# Patient Record
Sex: FEMALE | Race: WHITE
[De-identification: names, ages, dates, MRNs, and addresses within clinical notes are randomized per-mention and may not be internally consistent; named-entity substitution may affect disease eponyms.]

---

## 2020-10-23 ENCOUNTER — HOSPITAL ENCOUNTER (OUTPATIENT)
Dept: HOSPITAL 92 - BICULT | Age: 40
End: 2020-10-23
Attending: OBSTETRICS & GYNECOLOGY
Payer: COMMERCIAL

## 2020-10-23 DIAGNOSIS — Z17.0: ICD-10-CM

## 2020-10-23 DIAGNOSIS — C50.411: Primary | ICD-10-CM

## 2020-10-23 PROCEDURE — 88341 IMHCHEM/IMCYTCHM EA ADD ANTB: CPT

## 2020-10-23 PROCEDURE — 19083 BX BREAST 1ST LESION US IMAG: CPT

## 2020-10-23 PROCEDURE — 88305 TISSUE EXAM BY PATHOLOGIST: CPT

## 2020-10-23 PROCEDURE — 88342 IMHCHEM/IMCYTCHM 1ST ANTB: CPT

## 2020-10-23 PROCEDURE — 0H9U3ZX DRAINAGE OF LEFT BREAST, PERCUTANEOUS APPROACH, DIAGNOSTIC: ICD-10-PCS | Performed by: RADIOLOGY

## 2020-10-23 NOTE — MMO
FILMS COMPARED:

The present examination has been compared to prior imaging studies performed at

[facility] on 08/20/2020 and 10/14/2020.

 

MAMMOGRAM FINDINGS:

The breast is heterogeneously dense, which could obscure a lesion on mammography.

 

Biopsy marker is seen posterior outer breast.

 

IMPRESSION:

FINDING IN THE RIGHT BREAST IS HIGHLY SUGGESTIVE OF MALIGNANCY.

POST BIOPSY. PATHOLOGY PENDING.

 

ACR BI-RADS Category 5 - Highly suggestive of malignancy - appropriate action

should be taken

 

 

Reported by: CORTEZ NINA MD

Electonically Signed: [id number] staff

## 2020-10-23 NOTE — ULT
PROCEDURE:

1. Ultrasound guided core biopsy mass left breast.

2. Ultrasound guided biopsy marker placement at biopsy site. 

3. Post biopsy mammogram. 

 

INDICATIONS:

Patient referred from her primary care doctor in [place], [place]. Outside images from [facility]
on reviewed including mammogram and ultrasound exam. Mammograms showed suspicious microcalcifications
 in the upper outer right breast. Ultrasound demonstrate a hypoechoic mass which is labeled at 10 o'c
lock on the outside study. 

 

FINDINGS: 

1. Preoperative ultrasound of the right breast does demonstrate a hypoechoic spiculated mass at 9 to 
10 o'clock. This corresponds to the mass seen on the outside ultrasound exam. 

 

This mass is biopsy using ultrasound guided core biopsy with a 14 gauge bard instrument. Five core sp
ecimens were obtained through the mass. Prefire and post fire images with each specimen demonstrates 
biopsy through the mid portion of the mass. Specimens were placed in formalin. 

 

2. Biopsy marker was placed at biopsy site with ultrasound guidance. 

 

3. Postprocedure mammogram confirms biopsy marker in the posterior upper outer right breast correspon
ding to the location of the ultrasound mass. 

 

Biopsy report will be relayed to patient. Patient will follow-up with her primary care doctor in [facility] and seek further treatment in [place] as warranted from the biopsy report. 

 

PROCEDURE NOTE:

The right breast was prepped and draped in a sterile manner. Local anesthesia was administered in the
 outer right breast at site of selected puncture. Local anesthesia was administered under ultrasound 
guidance. There is a tiny skin nick made with scalpel. A 14 gauge Bard instrument with guide needle i
n place was introduced under ultrasound guidance. The instrument was introduced from the inferior out
er right breast approaching the 10 o'clock lesion. The biopsy needle was advanced to the leading edge
 of the mass. A prefire image was obtained. A post fire image confirmed biopsy to the mid portion of 
the mass. 

 

This was repeated x5 with five 14 gauge core specimens obtained and placed in formalin. Biopsy marker
 was then placed at biopsy site with ultrasound guidance. 

 

The patient tolerated the procedure well and was sent for postprocedure mammogram. 

 

Postprocedure mammogram confirmed biopsy marker in the posterior aspect of the upper outer right [place]
st. 

 

POS: OFF

## 2020-11-05 ENCOUNTER — HOSPITAL ENCOUNTER (OUTPATIENT)
Dept: HOSPITAL 92 - BICMRI | Age: 40
Discharge: HOME | End: 2020-11-05
Attending: SPECIALIST
Payer: COMMERCIAL

## 2020-11-05 DIAGNOSIS — N63.11: ICD-10-CM

## 2020-11-05 DIAGNOSIS — C50.911: Primary | ICD-10-CM

## 2020-11-05 PROCEDURE — A9577 INJ MULTIHANCE: HCPCS

## 2020-11-05 PROCEDURE — C8908 MRI W/O FOL W/CONT, BREAST,: HCPCS

## 2020-11-06 ENCOUNTER — HOSPITAL ENCOUNTER (OUTPATIENT)
Dept: HOSPITAL 92 - MAMMO | Age: 40
End: 2020-11-06
Attending: SPECIALIST
Payer: COMMERCIAL

## 2020-11-06 DIAGNOSIS — C50.911: Primary | ICD-10-CM

## 2020-11-06 PROCEDURE — 88341 IMHCHEM/IMCYTCHM EA ADD ANTB: CPT

## 2020-11-06 PROCEDURE — 76098 X-RAY EXAM SURGICAL SPECIMEN: CPT

## 2020-11-06 PROCEDURE — 88342 IMHCHEM/IMCYTCHM 1ST ANTB: CPT

## 2020-11-06 PROCEDURE — 0H9W0ZX DRAINAGE OF RIGHT NIPPLE, OPEN APPROACH, DIAGNOSTIC: ICD-10-PCS | Performed by: RADIOLOGY

## 2020-11-06 PROCEDURE — 19081 BX BREAST 1ST LESION STRTCTC: CPT

## 2020-11-06 PROCEDURE — 88305 TISSUE EXAM BY PATHOLOGIST: CPT

## 2020-11-06 NOTE — MMO
Stereotactic guided biopsy right breast microcalcifications.



Surgical specimen mammography right breast

Right diagnostic mammogram post biopsy



HISTORY: Breast cancer. Suspicious microcalcifications. Evaluate for DCIS.



FINDINGS: After explaining the procedure and answering all questions, the large area of microcalcific
ations deep within the lateral aspect of the right breast was visualized.



Sterile technique, buffered local anesthesia, stereotactic guidance, and a lateral approach were used
 to carefully advance a 10-gauge vacuum-assisted needle into the microcalcification cluster.

Position was confirmed with stereotactic imaging.



A total of 12 10-gauge vacuum-assisted specimens were obtained without difficulty.



Mammographic evaluation surgical specimen shows extensive microcalcifications throughout the tissue.



Localization clip was placed in the biopsy bed under stereotactic control. Needle was removed and hem
ostasis obtained using direct pressure. Patient tolerated the procedure well and was eventually

dismissed in good condition.



Postprocedure diagnostic mammogram images show removal of a portion of the microcalcification cluster
 deep within the lateral aspect of the right breast. Pocket of gas and localization clip are now

evident at the biopsy bed.



  



IMPRESSION :

Technically successful stereotactic guided biopsy right breast microcalcifications. Pathology is pend
ing.



Reported By: LEIGH Grayson 

Electronically Signed:  11/6/2020 8:54 AM

## 2020-11-09 NOTE — MRI
MRI BREAST W W/O CONT BILAT



History: Abnormal right breast mass and breast calcifications



Comparison: Ultrasonographic right breast mass biopsy October 23, 2020. Mammogram right breast Octobe
r 14, 2020. Screening mammogram of both breasts August 2020.



Findings: Multiplanar multisequence MRI of the breasts was performed prior to and after the intraveno
us ministration of contrast. Exam was reviewed on an independent 3-D workstation.



The breasts are heterogeneously dense. Mild background parenchymal enhancement.



Corresponding to the ultrasonographic findings is a 1.4 cm mass upper outer quadrant right breast. Th
is has type III kinetics. Adjacent to this are smaller masses;  the first is medial to the primary

mass approximately 7 mm measuring up to 6 mm in size. This also has type III kinetics. Anterior to th
e primary mass, approximately 1.3 cm, is another 6 mm focus of abnormal hyperenhancement. This is

all within the same segment of breast.



Crossing the right upper outer and right lower outer quadrants is non-masslike enhancement. This abno
rmal area of enhancement corresponds to abnormal calcifications seen on recent mammography.



In the left breast, there is a small enhancing focus measuring 3 mm, 1.5 cm from the nipple, adjacent
 to a cyst. This cyst measures up to 1.4 cm in size. There is also lower outer and upper outer

cysts of the left breast.



Complex cyst upper outer left breast posterior depth does not have any significant internal enhanceme
nt.



Impression: 

BI-RADS Category 6: Known malignancy. This is the 1.3 cm mass posterior depth upper outer right breas
t.  High suspicion for multifocal disease with adjacent small satellite masses along with adjacent

multicentric upper outer and lower outer quadrant non-masslike enhancement suggesting DCIS.

2. Small enhancing focus of the left breast measuring 3 mm 1.5 cm from the nipple is too small to yuli
racterize and not well seen on prior ultrasound or mammogram. Follow-up breast MRI in 6 months

recommended.



Transcribed Date/Time: 11/9/2020 11:55 AM



Reported By: Siva Watson 

Electronically Signed:  11/9/2020 2:45 PM

## 2020-11-19 ENCOUNTER — HOSPITAL ENCOUNTER (OUTPATIENT)
Dept: HOSPITAL 92 - LABBT | Age: 40
Discharge: HOME | End: 2020-11-19
Attending: SPECIALIST
Payer: COMMERCIAL

## 2020-11-19 DIAGNOSIS — C50.911: ICD-10-CM

## 2020-11-19 DIAGNOSIS — Z20.828: ICD-10-CM

## 2020-11-19 DIAGNOSIS — Z01.812: Primary | ICD-10-CM

## 2020-11-19 LAB
ANION GAP SERPL CALC-SCNC: 11 MMOL/L (ref 10–20)
BASOPHILS # BLD AUTO: 0 10X3/UL (ref 0–0.2)
BASOPHILS NFR BLD AUTO: 0.4 % (ref 0–2)
BUN SERPL-MCNC: 14 MG/DL (ref 7–18.7)
CALCIUM SERPL-MCNC: 9.2 MG/DL (ref 7.8–10.44)
CHLORIDE SERPL-SCNC: 105 MMOL/L (ref 98–107)
CO2 SERPL-SCNC: 26 MMOL/L (ref 22–29)
CREAT CL PREDICTED SERPL C-G-VRATE: 0 ML/MIN (ref 70–130)
EOSINOPHIL # BLD AUTO: 0.1 10X3/UL (ref 0–0.5)
EOSINOPHIL NFR BLD AUTO: 0.9 % (ref 0–6)
GLUCOSE SERPL-MCNC: 85 MG/DL (ref 70–105)
HGB BLD-MCNC: 13.4 G/DL (ref 12–16)
LYMPHOCYTES NFR BLD AUTO: 33.5 % (ref 18–47)
MCH RBC QN AUTO: 30.5 PG (ref 27–33)
MCV RBC AUTO: 92.3 FL (ref 80–100)
MONOCYTES # BLD AUTO: 0.8 10X3/UL (ref 0–1.1)
MONOCYTES NFR BLD AUTO: 9.5 % (ref 0–10)
NEUTROPHILS # BLD AUTO: 4.5 10X3/UL (ref 1.5–8.4)
NEUTROPHILS NFR BLD AUTO: 55.5 % (ref 40–75)
PLATELET # BLD AUTO: 280 10X3/UL (ref 130–400)
POTASSIUM SERPL-SCNC: 4.3 MMOL/L (ref 3.5–5.1)
PREGS CONTROL BACKGROUND?: (no result)
PREGS CONTROL BAR APPEAR?: YES
RBC # BLD AUTO: 4.39 10X6/UL (ref 3.9–5.2)
SODIUM SERPL-SCNC: 138 MMOL/L (ref 136–145)
WBC # BLD AUTO: 8 10X3/UL (ref 4.5–11)

## 2020-11-19 PROCEDURE — 85025 COMPLETE CBC W/AUTO DIFF WBC: CPT

## 2020-11-19 PROCEDURE — 87635 SARS-COV-2 COVID-19 AMP PRB: CPT

## 2020-11-19 PROCEDURE — 84703 CHORIONIC GONADOTROPIN ASSAY: CPT

## 2020-11-19 PROCEDURE — U0003 INFECTIOUS AGENT DETECTION BY NUCLEIC ACID (DNA OR RNA); SEVERE ACUTE RESPIRATORY SYNDROME CORONAVIRUS 2 (SARS-COV-2) (CORONAVIRUS DISEASE [COVID-19]), AMPLIFIED PROBE TECHNIQUE, MAKING USE OF HIGH THROUGHPUT TECHNOLOGIES AS DESCRIBED BY CMS-2020-01-R: HCPCS

## 2020-11-19 PROCEDURE — 80048 BASIC METABOLIC PNL TOTAL CA: CPT

## 2020-11-24 ENCOUNTER — HOSPITAL ENCOUNTER (OUTPATIENT)
Dept: HOSPITAL 92 - SDC | Age: 40
Discharge: HOME | End: 2020-11-24
Attending: SPECIALIST
Payer: COMMERCIAL

## 2020-11-24 VITALS — BODY MASS INDEX: 23.3 KG/M2

## 2020-11-24 DIAGNOSIS — Z91.048: ICD-10-CM

## 2020-11-24 DIAGNOSIS — C50.411: Primary | ICD-10-CM

## 2020-11-24 DIAGNOSIS — Z87.891: ICD-10-CM

## 2020-11-24 DIAGNOSIS — N60.22: ICD-10-CM

## 2020-11-24 DIAGNOSIS — F41.9: ICD-10-CM

## 2020-11-24 DIAGNOSIS — D64.9: ICD-10-CM

## 2020-11-24 DIAGNOSIS — Z17.1: ICD-10-CM

## 2020-11-24 DIAGNOSIS — N60.82: ICD-10-CM

## 2020-11-24 PROCEDURE — 88334 PATH CONSLTJ SURG CYTO XM EA: CPT

## 2020-11-24 PROCEDURE — A9541 TC99M SULFUR COLLOID: HCPCS

## 2020-11-24 PROCEDURE — 88331 PATH CONSLTJ SURG 1 BLK 1SPC: CPT

## 2020-11-24 PROCEDURE — 71045 X-RAY EXAM CHEST 1 VIEW: CPT

## 2020-11-24 PROCEDURE — 0HTV0ZZ RESECTION OF BILATERAL BREAST, OPEN APPROACH: ICD-10-PCS | Performed by: SPECIALIST

## 2020-11-24 PROCEDURE — 02HV33Z INSERTION OF INFUSION DEVICE INTO SUPERIOR VENA CAVA, PERCUTANEOUS APPROACH: ICD-10-PCS | Performed by: SPECIALIST

## 2020-11-24 PROCEDURE — S0020 INJECTION, BUPIVICAINE HYDRO: HCPCS

## 2020-11-24 PROCEDURE — 88333 PATH CONSLTJ SURG CYTO XM 1: CPT

## 2020-11-24 PROCEDURE — C1788 PORT, INDWELLING, IMP: HCPCS

## 2020-11-24 PROCEDURE — 88342 IMHCHEM/IMCYTCHM 1ST ANTB: CPT

## 2020-11-24 PROCEDURE — 78195 LYMPH SYSTEM IMAGING: CPT

## 2020-11-24 PROCEDURE — 07B50ZX EXCISION OF RIGHT AXILLARY LYMPHATIC, OPEN APPROACH, DIAGNOSTIC: ICD-10-PCS | Performed by: SPECIALIST

## 2020-11-24 PROCEDURE — 88307 TISSUE EXAM BY PATHOLOGIST: CPT

## 2020-11-24 PROCEDURE — 88305 TISSUE EXAM BY PATHOLOGIST: CPT

## 2020-11-24 NOTE — RAD
RADIOGRAPH CHEST 1 VIEW:



DATE:

11/24/2020



HISTORY:

40-year-old female status post MediPort placement



COMPARISON:

None



FINDINGS:

There are no airspace densities, pulmonary edema, pneumothorax, or cardiomegaly. The lateral costophr
enic angles are excluded from the field-of-view. There is a left subclavian MediPort with distal

tip overlying SVC.



IMPRESSION:

1) Left-sided implantable vascular access port. No pneumothorax.

2) No acute cardiopulmonary findings.



Reported By: Francisco Vazquez 

Electronically Signed:  11/24/2020 5:12 PM

## 2020-11-24 NOTE — NM
PROCEDURE: Lymphoscintigraphy of the right breast



HISTORY: Upper outer right breast cancer



: oYlanda



AGENT: 399  uCi of technetium 99 M filtered sulfur colloid



TECHNIQUE: The breast was prepped with alcohol in the periareolar region. The radiopharmaceutical was
 injected into 4 spots surrounding the nipple at the 12:00, 3:00, 6:00, and 9:00 positions. 



Massage was performed of the breast helping the radiopharmaceutical enter the lymphatics. Images obta
ined showed uptake of the radiopharmaceutical within an axillary lymph node.



IMPRESSION: Right axillary sentinel lymph node



Reported By: Jayden Guerrero 

Electronically Signed:  11/24/2020 8:42 AM

## 2020-11-25 NOTE — OP
DATE OF PROCEDURE:  11/24/2020



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

carcinoma in situ. 



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

carcinoma in situ. 



OPERATION PERFORMED:  Bilateral nipple-sparing mastectomy with right axillary

sentinel lymph node biopsy and left subclavian low-profile power compatible MediPort

placement. 



ANESTHESIA:  General endotracheal.



INDICATIONS:  The patient is a 40-year-old white female.  She was recently diagnosed

with invasive ductal carcinoma of the upper right breast.  She has further areas of

ductal carcinoma in situ, remote from the malignancy that makes any operation other

than a mastectomy unfeasible.  In light of this, she has elected to proceed with a

bilateral mastectomy.  This will be performed in nipple sparing fashion to allow for

later reconstruction.  Her cancer is HER-2/raul positive, and her oncologist has

requested MediPort placement for chemotherapy administration. 



DESCRIPTION OF OPERATION:  Informed consent was obtained.  Lymphoscintigraphy was

performed of the right breast revealing axillary lymph nodes.  She was taken to the

operating room, where general endotracheal anesthesia was obtained with patient in

supine position.  3 mL of Lymphazurin was infiltrated in the periareolar subdermal

tissue and massaged for 5 minutes.  Bilateral breasts and axillae were prepped with

ChloraPrep and draped in sterile fashion.  Attention was turned first to the

MediPort placement.  A low-profile power compatible port was placed in the standard

fashion, accessing the left subclavian vein uneventfully and utilizing fluoroscopy.

The port was secured to the pectoral fascia with 2 interrupted sutures of 3-0

Prolene.  The port aspirated blood freely.  It was flushed with heparinized saline

and the wound was closed in layers with 3-0 and 4-0 Monocryl. 



Attention was then turned to the right axilla.  A transverse axillary incision was

created after local anesthetic was infiltrated.  Dissection was carried through skin

and subcutaneous tissue.  Neoprobe was utilized to identify areas of maximum radio

intensity.  I was able to identify 3 separate sentinel lymph nodes utilizing both

the blue dye and the radioactive tracer.  Each of these three lymph nodes was

dissected circumferentially and all investing lymphatics were divided between clamps

and 3-0 silk ties.  Intraoperative assessment showed that all three of these were

negative.  The wound was closed in layers with 3-0 and 4-0 Monocryl.  Attention was

then turned to the left breast.  An incision was created in the left inframammary

crease.  Dissection was carried down to the chest wall and the breast was elevated

off the underlying muscular fascia using careful dissection utilizing the

PlasmaBlade.  An adipose dermal flap was then elevated off the underlying breast

tissue, taking care to maintain appropriate cutaneous viability while removing all

of the breast tissue.  The dissection was carried up to the extent of the breast

superiorly and laterally.  The specimen was removed intact and oriented with suture

and submitted to Pathology.  The wound was irrigated.  Meticulous hemostasis was

obtained with electrocautery.  A #19 round fluted drain was placed within the wound

and brought out laterally and inferiorly and secured with 3-0 nylon suture.  The

incision was then closed in layers with a 3-0 and 4-0 Monocryl suture. 



Attention was finally turned to the right breast.  A mirror-image operation was

performed on the right breast as had been performed on the left.  At no point was I

able to feel or see the malignancy within the breast.  There was an area of

indurated tissue from her recent biopsy in the upper outer right breast and a

thinner skin flap was elevated in that particular area in case there was some

remnant of malignancy or DCIS at that location.  Otherwise, the operation was

identical to that on the left.  The wound was again irrigated, drain was placed, and

the wound was closed in a similar fashion.  Dermabond was placed over each of the

four incisions.  A fluffed gauze dressing was placed across the chest wall and

secured in place with a SilversteinWrap.  There were no complications.  Blood loss

was minimal.  The patient tolerated the procedure well and was taken to recovery

room in stable condition. 







Job ID:  [id number]

## 2020-12-30 ENCOUNTER — HOSPITAL ENCOUNTER (OUTPATIENT)
Dept: HOSPITAL 92 - ULT | Age: 40
Discharge: HOME | End: 2020-12-30
Attending: INTERNAL MEDICINE
Payer: COMMERCIAL

## 2020-12-30 DIAGNOSIS — C50.811: ICD-10-CM

## 2020-12-30 DIAGNOSIS — I07.1: ICD-10-CM

## 2020-12-30 DIAGNOSIS — Z51.11: Primary | ICD-10-CM

## 2020-12-30 DIAGNOSIS — Z79.899: ICD-10-CM

## 2020-12-30 PROCEDURE — 93306 TTE W/DOPPLER COMPLETE: CPT

## 2021-02-25 ENCOUNTER — HOSPITAL ENCOUNTER (OUTPATIENT)
Dept: HOSPITAL 92 - ULT | Age: 41
Discharge: HOME | End: 2021-02-25
Attending: INTERNAL MEDICINE
Payer: COMMERCIAL

## 2021-02-25 DIAGNOSIS — Z51.11: Primary | ICD-10-CM

## 2021-02-25 DIAGNOSIS — C50.811: ICD-10-CM

## 2021-02-25 DIAGNOSIS — Z79.899: ICD-10-CM

## 2021-02-25 PROCEDURE — 93306 TTE W/DOPPLER COMPLETE: CPT

## 2021-06-15 ENCOUNTER — HOSPITAL ENCOUNTER (OUTPATIENT)
Dept: HOSPITAL 92 - ULT | Age: 41
Discharge: HOME | End: 2021-06-15
Attending: INTERNAL MEDICINE
Payer: COMMERCIAL

## 2021-06-15 DIAGNOSIS — Z51.11: Primary | ICD-10-CM

## 2021-06-15 DIAGNOSIS — Z79.899: ICD-10-CM

## 2021-06-15 DIAGNOSIS — I07.1: ICD-10-CM

## 2021-06-15 DIAGNOSIS — C50.811: ICD-10-CM

## 2021-06-15 PROCEDURE — 93306 TTE W/DOPPLER COMPLETE: CPT

## 2021-10-19 ENCOUNTER — HOSPITAL ENCOUNTER (OUTPATIENT)
Dept: HOSPITAL 92 - ULT | Age: 41
Discharge: HOME | End: 2021-10-19
Attending: INTERNAL MEDICINE
Payer: COMMERCIAL

## 2021-10-19 DIAGNOSIS — C50.811: ICD-10-CM

## 2021-10-19 DIAGNOSIS — I08.1: ICD-10-CM

## 2021-10-19 DIAGNOSIS — Z51.11: Primary | ICD-10-CM

## 2021-10-19 PROCEDURE — 93306 TTE W/DOPPLER COMPLETE: CPT
